# Patient Record
Sex: FEMALE | Race: WHITE | Employment: OTHER | URBAN - METROPOLITAN AREA
[De-identification: names, ages, dates, MRNs, and addresses within clinical notes are randomized per-mention and may not be internally consistent; named-entity substitution may affect disease eponyms.]

---

## 2021-10-24 ENCOUNTER — ANESTHESIA EVENT (OUTPATIENT)
Dept: ENDOSCOPY | Age: 71
End: 2021-10-24
Payer: MEDICARE

## 2021-10-24 RX ORDER — SODIUM CHLORIDE, SODIUM LACTATE, POTASSIUM CHLORIDE, CALCIUM CHLORIDE 600; 310; 30; 20 MG/100ML; MG/100ML; MG/100ML; MG/100ML
100 INJECTION, SOLUTION INTRAVENOUS CONTINUOUS
Status: CANCELLED | OUTPATIENT
Start: 2021-10-24

## 2021-10-24 RX ORDER — SODIUM CHLORIDE 0.9 % (FLUSH) 0.9 %
5-40 SYRINGE (ML) INJECTION EVERY 8 HOURS
Status: CANCELLED | OUTPATIENT
Start: 2021-10-24

## 2021-10-24 RX ORDER — SODIUM CHLORIDE 0.9 % (FLUSH) 0.9 %
5-40 SYRINGE (ML) INJECTION AS NEEDED
Status: CANCELLED | OUTPATIENT
Start: 2021-10-24

## 2021-10-25 ENCOUNTER — ANESTHESIA (OUTPATIENT)
Dept: ENDOSCOPY | Age: 71
End: 2021-10-25
Payer: MEDICARE

## 2021-10-25 ENCOUNTER — HOSPITAL ENCOUNTER (OUTPATIENT)
Age: 71
Setting detail: OUTPATIENT SURGERY
Discharge: HOME | End: 2021-10-25
Attending: INTERNAL MEDICINE | Admitting: INTERNAL MEDICINE
Payer: MEDICARE

## 2021-10-25 VITALS
HEART RATE: 84 BPM | OXYGEN SATURATION: 99 % | DIASTOLIC BLOOD PRESSURE: 59 MMHG | WEIGHT: 164 LBS | HEIGHT: 64 IN | SYSTOLIC BLOOD PRESSURE: 116 MMHG | BODY MASS INDEX: 28 KG/M2 | RESPIRATION RATE: 16 BRPM | TEMPERATURE: 98 F

## 2021-10-25 PROCEDURE — 2709999900 HC NON-CHARGEABLE SUPPLY: Performed by: INTERNAL MEDICINE

## 2021-10-25 PROCEDURE — 76040000025: Performed by: INTERNAL MEDICINE

## 2021-10-25 PROCEDURE — 74011000250 HC RX REV CODE- 250: Performed by: NURSE ANESTHETIST, CERTIFIED REGISTERED

## 2021-10-25 PROCEDURE — 88305 TISSUE EXAM BY PATHOLOGIST: CPT

## 2021-10-25 PROCEDURE — 88312 SPECIAL STAINS GROUP 1: CPT

## 2021-10-25 PROCEDURE — 76060000031 HC ANESTHESIA FIRST 0.5 HR: Performed by: INTERNAL MEDICINE

## 2021-10-25 PROCEDURE — 77030021593 HC FCPS BIOP ENDOSC BSC -A: Performed by: INTERNAL MEDICINE

## 2021-10-25 PROCEDURE — 74011250636 HC RX REV CODE- 250/636: Performed by: NURSE ANESTHETIST, CERTIFIED REGISTERED

## 2021-10-25 RX ORDER — GLYCOPYRROLATE 0.2 MG/ML
INJECTION INTRAMUSCULAR; INTRAVENOUS AS NEEDED
Status: DISCONTINUED | OUTPATIENT
Start: 2021-10-25 | End: 2021-10-25 | Stop reason: HOSPADM

## 2021-10-25 RX ORDER — SODIUM CHLORIDE, SODIUM LACTATE, POTASSIUM CHLORIDE, CALCIUM CHLORIDE 600; 310; 30; 20 MG/100ML; MG/100ML; MG/100ML; MG/100ML
INJECTION, SOLUTION INTRAVENOUS
Status: DISCONTINUED | OUTPATIENT
Start: 2021-10-25 | End: 2021-10-25 | Stop reason: HOSPADM

## 2021-10-25 RX ORDER — LIDOCAINE HYDROCHLORIDE 20 MG/ML
INJECTION, SOLUTION EPIDURAL; INFILTRATION; INTRACAUDAL; PERINEURAL AS NEEDED
Status: DISCONTINUED | OUTPATIENT
Start: 2021-10-25 | End: 2021-10-25 | Stop reason: HOSPADM

## 2021-10-25 RX ORDER — PROPOFOL 10 MG/ML
INJECTION, EMULSION INTRAVENOUS
Status: DISCONTINUED | OUTPATIENT
Start: 2021-10-25 | End: 2021-10-25 | Stop reason: HOSPADM

## 2021-10-25 RX ORDER — PROPOFOL 10 MG/ML
INJECTION, EMULSION INTRAVENOUS AS NEEDED
Status: DISCONTINUED | OUTPATIENT
Start: 2021-10-25 | End: 2021-10-25 | Stop reason: HOSPADM

## 2021-10-25 RX ADMIN — SODIUM CHLORIDE, SODIUM LACTATE, POTASSIUM CHLORIDE, AND CALCIUM CHLORIDE: 600; 310; 30; 20 INJECTION, SOLUTION INTRAVENOUS at 10:48

## 2021-10-25 RX ADMIN — PROPOFOL 50 MG: 10 INJECTION, EMULSION INTRAVENOUS at 10:54

## 2021-10-25 RX ADMIN — LIDOCAINE HYDROCHLORIDE 100 MG: 20 INJECTION, SOLUTION EPIDURAL; INFILTRATION; INTRACAUDAL; PERINEURAL at 10:54

## 2021-10-25 RX ADMIN — PROPOFOL 180 MCG/KG/MIN: 10 INJECTION, EMULSION INTRAVENOUS at 10:54

## 2021-10-25 RX ADMIN — GLYCOPYRROLATE 0.1 MG: 0.2 INJECTION INTRAMUSCULAR; INTRAVENOUS at 10:54

## 2021-10-25 RX ADMIN — PROPOFOL 20 MG: 10 INJECTION, EMULSION INTRAVENOUS at 10:55

## 2021-10-25 NOTE — DISCHARGE INSTRUCTIONS
Gastrointestinal Esophagogastroduodenoscopy (EGD) - Upper Exam Discharge Instructions    1. Call Dr. Milly Call at [phone] for any problems or questions. 2. Contact the doctor's office for follow up appointment as directed. 3. Medication may cause drowsiness for several hours, therefore:  · Do not drive or operate machinery for remainder of the day. · No alcohol today. · Do not make any important or legal decisions for 24 hours. · Do not sign any legal documents for 24 hours. 5. Ordinarily, you may resume regular diet and activity after exam unless otherwise specified by your physician. 6. For mild soreness in your throat you may use Cepacol throat lozenges or warm salt-water gargles as needed.     Any additional instructions:  Dr. India Doherty office will call with pathology results and follow up appointment

## 2021-10-25 NOTE — PROGRESS NOTES
's visit attempted in pre- area. Ms. Karlene Serrano went to her procedure prior to my arrival. I visited with family waiting and conveyed care and concern. Family voiced no needs.      Jacinta Mills 67  Board Certified

## 2021-10-25 NOTE — PROGRESS NOTES
's pre-procedure visit and prayer with patient as requested.     Gregory Falcon MDiv, BS  Board Certified

## 2021-10-25 NOTE — PROCEDURES
Esophagogastroduodenoscopy    DATE of PROCEDURE: 10/25/2021    INDICATION:    POSTPROCEDURE DIAGNOSIS:    MEDICATIONS ADMINISTERED: MAC anesthesia (see anesthesia report)    INSTRUMENT:    PROCEDURE:  After obtaining informed consent, the patient was placed in the left lateral position and sedated. The endoscope was advanced under direct vision without difficulty. The esophagus, stomach (including retroflexed views) and duodenum were evaluated. The patient was taken to the recovery area in stable condition.     FINDINGS:  ESOPHAGUS: no varices  STOMACH: antral gastritis, small hiatal hernia; folds distended normally, no portal HTN gastropathy or varices  DUODENUM: 5 mm polyp in the bulb (cold biopsies taken), nodular appearing bulb    Estimated blood loss: 0-minimal   Specimens obtained during procedure: yes    PLAN:  Check path  OV 3 mo

## 2021-10-25 NOTE — H&P
PreProcedure H&P Update    Today's Date:  10/25/2021    CC:  cirrhosis    Subjective:   HPI: cirrhosis    PMH:  Past Medical History:   Diagnosis Date    Hearing loss     wears hearing aids    History of IBS     Interstitial lung disease ([address]     wears 2L NC continuous    Jaundice     patient reported elevated liver enzymes    Nausea & vomiting     no nausea with recent lung biopsy 2021    Osteoporosis     Primary biliary cholangitis ([address]     patient reported    Pruritus     Subjective tremor     Thyroid disease     medications    Vitamin D deficiency        Medications:   No current facility-administered medications for this encounter. Objective:   Vitals:  Visit Vitals  /80   Pulse 76   Temp 97.7 °F (36.5 °C)   Resp 18   Ht 5' 4\" (1.626 m)   Wt 74.4 kg (164 lb)   SpO2 100%   BMI 28.15 kg/m²     Exam:  General appearance: alert, cooperative, no distress  Lungs: clear to auscultation bilaterally anteriorly  Heart: regular rate and rhythm  Abdomen: soft, non-tender. Bowel sounds normal. No masses, no organomegaly      Data Review (Labs):    No results for input(s): WBC, HGB, HCT, PLT, MCV, NA, K, CL, CO2, BUN, CREA, CA, GLU, AP, TBIL, CBIL, ALB, TP, AML, LPSE, PTP, INR, APTT, HGBEXT, HCTEXT, PLTEXT, INREXT in the last 72 hours. No lab exists for component: SGOT, GPT, DBIL    Plan:     Cirrhosis.   EGD to screen for esophageal varices

## 2021-10-25 NOTE — ANESTHESIA PREPROCEDURE EVALUATION
Relevant Problems   No relevant active problems       Anesthetic History     PONV          Review of Systems / Medical History  Patient summary reviewed and pertinent labs reviewed    Pulmonary          Shortness of breath      Comments: ILD - on O2 continuously   Neuro/Psych   Within defined limits           Cardiovascular                  Exercise tolerance: <4 METS     GI/Hepatic/Renal           Liver disease (Primary biliary cirrhosis)     Endo/Other      Hypothyroidism: well controlled       Other Findings              Physical Exam    Airway  Mallampati: II  TM Distance: 4 - 6 cm  Neck ROM: normal range of motion   Mouth opening: Normal     Cardiovascular    Rhythm: regular  Rate: normal         Dental    Dentition: Upper partial plate     Pulmonary      Decreased breath sounds: bilateral           Abdominal         Other Findings            Anesthetic Plan    ASA: 4  Anesthesia type: total IV anesthesia          Induction: Intravenous  Anesthetic plan and risks discussed with: Patient

## 2021-10-27 NOTE — ANESTHESIA POSTPROCEDURE EVALUATION
Procedure(s):  ESOPHAGOGASTRODUODENOSCOPY (EGD)/BMI 30  ESOPHAGOGASTRODUODENAL (EGD) BIOPSY.     total IV anesthesia    Anesthesia Post Evaluation      Multimodal analgesia: multimodal analgesia used between 6 hours prior to anesthesia start to PACU discharge  Patient location during evaluation: PACU  Patient participation: complete - patient participated  Level of consciousness: awake and alert  Pain management: adequate  Airway patency: patent  Anesthetic complications: no  Cardiovascular status: acceptable and hemodynamically stable  Respiratory status: acceptable  Hydration status: acceptable  Post anesthesia nausea and vomiting:  none  Final Post Anesthesia Temperature Assessment:  Normothermia (36.0-37.5 degrees C)      INITIAL Post-op Vital signs:   Vitals Value Taken Time   /59 10/25/21 1117   Temp 36.7 °C (98 °F) 10/25/21 1106   Pulse 84 10/25/21 1117   Resp 16 10/25/21 1117   SpO2 99 % 10/25/21 1117

## 2022-09-09 ENCOUNTER — PREP FOR PROCEDURE (OUTPATIENT)
Dept: ADMINISTRATIVE | Age: 72
End: 2022-09-09

## 2022-09-09 RX ORDER — SODIUM CHLORIDE 0.9 % (FLUSH) 0.9 %
5-40 SYRINGE (ML) INJECTION EVERY 12 HOURS SCHEDULED
OUTPATIENT
Start: 2022-09-09

## 2022-09-09 RX ORDER — SODIUM CHLORIDE 9 MG/ML
INJECTION, SOLUTION INTRAVENOUS PRN
OUTPATIENT
Start: 2022-09-09

## 2022-09-09 RX ORDER — SODIUM CHLORIDE 0.9 % (FLUSH) 0.9 %
5-40 SYRINGE (ML) INJECTION PRN
OUTPATIENT
Start: 2022-09-09

## 2022-10-11 RX ORDER — PRAVASTATIN SODIUM 10 MG
10 TABLET ORAL NIGHTLY
COMMUNITY

## 2022-10-11 RX ORDER — TRAZODONE HYDROCHLORIDE 50 MG/1
50 TABLET ORAL AS NEEDED
COMMUNITY

## 2022-10-11 RX ORDER — ONDANSETRON 4 MG/1
4 TABLET, FILM COATED ORAL EVERY 8 HOURS PRN
COMMUNITY

## 2022-10-11 NOTE — PERIOP NOTE
Patient verified name, , and procedure. Type: 1a; abbreviated assessment per anesthesia guidelines  Labs per surgeon: none  Labs per anesthesia: none      Instructed pt that they will be notified by the Gi Lab for time of arrival. If any questions please call the GI lab at [phone]. Follow diet and prep instructions per office. May have clear liquids until 4 hours prior to time of arrival.    [place]oxo or shower the night before and the am of surgery with antibacterial soap. No lotions, oils, powders, cologne on skin. No make up, eye make up or jewelry. Wear loose fitting comfortable, clean clothing. Must have adult present in building the entire time . Medications for the day of procedure: Synthroid. Pt instructed to hold: Biotin,Vitamin D, and all other vitamins, supplements, and NSAIDs. The following discharge instructions reviewed with patient: medication given during procedure may cause drowsiness for several hours, therefore, do not drive or operate machinery for remainder of the day, no alcohol on the day of your procedure, resume regular diet and activity unless otherwise directed, for mild sore throat you may use Cepacol throat lozenges or warm salt water gargles as needed, call your physician for any problems or questions. Patient verbalizes understanding.

## 2022-10-13 NOTE — PROGRESS NOTES
Spoke with pt regarding tomorrow's procedure. PT verbalized understanding of early arrival time as well as  policy.

## 2022-10-14 ENCOUNTER — ANESTHESIA EVENT (OUTPATIENT)
Dept: ENDOSCOPY | Age: 72
End: 2022-10-14
Payer: MEDICARE

## 2022-10-14 ENCOUNTER — ANESTHESIA (OUTPATIENT)
Dept: ENDOSCOPY | Age: 72
End: 2022-10-14
Payer: MEDICARE

## 2022-10-14 ENCOUNTER — HOSPITAL ENCOUNTER (OUTPATIENT)
Age: 72
Setting detail: OUTPATIENT SURGERY
Discharge: HOME | End: 2022-10-14
Attending: INTERNAL MEDICINE | Admitting: INTERNAL MEDICINE
Payer: MEDICARE

## 2022-10-14 VITALS
OXYGEN SATURATION: 100 % | WEIGHT: 132 LBS | HEART RATE: 69 BPM | DIASTOLIC BLOOD PRESSURE: 54 MMHG | BODY MASS INDEX: 22.53 KG/M2 | SYSTOLIC BLOOD PRESSURE: 104 MMHG | HEIGHT: 64 IN | TEMPERATURE: 98.5 F | RESPIRATION RATE: 17 BRPM

## 2022-10-14 PROCEDURE — 6360000002 HC RX W HCPCS: Performed by: NURSE ANESTHETIST, CERTIFIED REGISTERED

## 2022-10-14 PROCEDURE — 3609012400 HC EGD TRANSORAL BIOPSY SINGLE/MULTIPLE: Performed by: INTERNAL MEDICINE

## 2022-10-14 PROCEDURE — 7100000011 HC PHASE II RECOVERY - ADDTL 15 MIN: Performed by: INTERNAL MEDICINE

## 2022-10-14 PROCEDURE — 3700000000 HC ANESTHESIA ATTENDED CARE: Performed by: INTERNAL MEDICINE

## 2022-10-14 PROCEDURE — 2580000003 HC RX 258: Performed by: ANESTHESIOLOGY

## 2022-10-14 PROCEDURE — 88305 TISSUE EXAM BY PATHOLOGIST: CPT

## 2022-10-14 PROCEDURE — 7100000010 HC PHASE II RECOVERY - FIRST 15 MIN: Performed by: INTERNAL MEDICINE

## 2022-10-14 PROCEDURE — 2709999900 HC NON-CHARGEABLE SUPPLY: Performed by: INTERNAL MEDICINE

## 2022-10-14 RX ORDER — PROPOFOL 10 MG/ML
INJECTION, EMULSION INTRAVENOUS PRN
Status: DISCONTINUED | OUTPATIENT
Start: 2022-10-14 | End: 2022-10-14 | Stop reason: SDUPTHER

## 2022-10-14 RX ORDER — URSODIOL 300 MG/1
300 CAPSULE ORAL 2 TIMES DAILY
COMMUNITY

## 2022-10-14 RX ORDER — SODIUM CHLORIDE, SODIUM LACTATE, POTASSIUM CHLORIDE, CALCIUM CHLORIDE 600; 310; 30; 20 MG/100ML; MG/100ML; MG/100ML; MG/100ML
INJECTION, SOLUTION INTRAVENOUS CONTINUOUS
Status: DISCONTINUED | OUTPATIENT
Start: 2022-10-14 | End: 2022-10-14 | Stop reason: HOSPADM

## 2022-10-14 RX ADMIN — PROPOFOL 50 MG: 10 INJECTION, EMULSION INTRAVENOUS at 09:49

## 2022-10-14 RX ADMIN — SODIUM CHLORIDE, POTASSIUM CHLORIDE, SODIUM LACTATE AND CALCIUM CHLORIDE: 600; 310; 30; 20 INJECTION, SOLUTION INTRAVENOUS at 09:38

## 2022-10-14 RX ADMIN — PROPOFOL 50 MG: 10 INJECTION, EMULSION INTRAVENOUS at 09:51

## 2022-10-14 RX ADMIN — PROPOFOL 50 MG: 10 INJECTION, EMULSION INTRAVENOUS at 09:46

## 2022-10-14 ASSESSMENT — PAIN - FUNCTIONAL ASSESSMENT
PAIN_FUNCTIONAL_ASSESSMENT: NONE - DENIES PAIN
PAIN_FUNCTIONAL_ASSESSMENT: FACE, LEGS, ACTIVITY, CRY, AND CONSOLABILITY (FLACC)
PAIN_FUNCTIONAL_ASSESSMENT: NONE - DENIES PAIN

## 2022-10-14 ASSESSMENT — ENCOUNTER SYMPTOMS: SHORTNESS OF BREATH: 1

## 2022-10-14 NOTE — DISCHARGE INSTRUCTIONS
Gastrointestinal Esophagogastroduodenoscopy (EGD) - Upper Exam Discharge Instructions    1. Call Dr. Glenny Sánchez at [phone] for any problems or questions. 2. Contact the doctor's office for follow up appointment as directed. 3. Medication may cause drowsiness for several hours, therefore:  Do not drive or operate machinery for remainder of the day. No alcohol today. Do not make any important or legal decisions for 24 hours. Do not sign any legal documents for 24 hours. 5. Ordinarily, you may resume regular diet and activity after exam unless otherwise specified by your physician. 6. For mild soreness in your throat you may use Cepacol throat lozenges or warm salt-water gargles as needed. Any additional instructions:  Await pathology results. Repeat EGD in 3 years.

## 2022-10-14 NOTE — OP NOTE
PROCEDURE: Esophagogastroduodenoscopy with biopsy    ENDOSCOPIST: Matheus Duque M.D. PREOPERATIVE DIAGNOSIS: Duodenal polyp    POSTOPERATIVE DIAGNOSIS: Duodenitis, hiatal hernia    INSTRUMENTS: [id number]    SEDATION: MAC    DESCRIPTION: After informed consent was obtained, the patient was taken to the endoscopy suite and placed in the left lateral decubitus position. Intravenous sedation was administered by the Anesthesia provider. After adequate sedation had been achieved the endoscope was inserted over the tongue and through the posterior pharynx under direct visualization down the esophagus to the stomach and into the second portion of the duodenum. The endoscopic was withdrawn from that point, performing a careful survey of the mucosa. Retroflexion was performed in the gastric fundus. FINDINGS:  Esophagus: Normal. No varices    Stomach: Small hiatal hernia. No stigmata of portal HTN    Duodenum: The mucosa of the bulb is diffusely nodular. Biopsies obtained. D2 is normal.    Estimated blood loss:  0 cc-minimal    IMPRESSION: Nonspecific duodenopathy. No recurrent polyp    PLAN: Review pathology. Tentatively EGD 3 years for variceal screening.       Elise Moreno MD

## 2022-10-14 NOTE — ANESTHESIA PRE PROCEDURE
Department of Anesthesiology  Preprocedure Note       Name:  Lorna Mckeon   Age:  67 y.o.  :  1950                                          MRN:  [id number]         Date:  10/14/2022      Surgeon: Amira Rogers):  Joann Castellanos MD    Procedure: Procedure(s):  EGD ESOPHAGOGASTRODUODENOSCOPY/26    Medications prior to admission:   Prior to Admission medications    Medication Sig Start Date End Date Taking? Authorizing Provider   ursodiol (ACTIGALL) 300 MG capsule Take 300 mg by mouth 2 times daily   Yes Historical Provider, MD   rifAXIMin (XIFAXAN) 550 MG tablet Take 550 mg by mouth 2 times daily  Patient not taking: Reported on 10/14/2022   Yes Historical Provider, MD   traZODone (DESYREL) 50 MG tablet Take 50 mg by mouth as needed for Sleep   Yes Historical Provider, MD   pravastatin (PRAVACHOL) 10 MG tablet Take 10 mg by mouth at bedtime   Yes Historical Provider, MD   ondansetron (ZOFRAN) 4 MG tablet Take 4 mg by mouth every 8 hours as needed for Nausea or Vomiting   Yes Historical Provider, MD   BIOTIN PO Take by mouth at bedtime    Ar Automatic Reconciliation   Cholecalciferol 50 MCG (2000 UT) CAPS Take 5,000 Units by mouth daily    Ar Automatic Reconciliation   cyanocobalamin 1000 MCG/ML injection Inject 1,000 mcg into the muscle every 30 days 21   Ar Automatic Reconciliation   levothyroxine (SYNTHROID) 88 MCG tablet Take 88 mcg by mouth daily 21   Ar Automatic Reconciliation       Current medications:    No current facility-administered medications for this encounter. Allergies: Allergies   Allergen Reactions    Trimethoprim Nausea Only    Alendronate Other (See Comments)     Hair loss    Sulfamethoxazole-Trimethoprim Nausea Only    Nitrofurantoin Nausea Only       Problem List:  There is no problem list on file for this patient.       Past Medical History:        Diagnosis Date    Collar bone pain     broken collar bone on the left    Former cigarette smoker     Hearing loss     wearsbil  hearing aids    History of IBS     Hyperlipidemia     Interstitial lung disease (HCC)     wears 2L NC continuous    Jaundice     patient reported elevated liver enzymes    Nausea & vomiting     no nausea with recent lung biopsy     Osteoporosis     Oxygen dependent     2 LNC continuously    PONV (postoperative nausea and vomiting)     Pt has N/V with most procedures, pt did well with last 2 procedures    Primary biliary cholangitis ([address]     patient reported    Pruritus     Subjective tremor     Thyroid disease     medications    Vitamin D deficiency        Past Surgical History:        Procedure Laterality Date    BLADDER SUSPENSION      CHOLECYSTECTOMY, LAPAROSCOPIC      COLONOSCOPY      HYSTERECTOMY (CERVIX STATUS UNKNOWN)      LUNG REMOVAL, PARTIAL Right     right top lobectomy    ORTHOPEDIC SURGERY      humerus head    OTHER SURGICAL HISTORY Left     lung biopsy    ROTATOR CUFF REPAIR Right        Social History:    Social History     Tobacco Use    Smoking status: Former     Packs/day: 1.50     Years: 15.00     Pack years: 22.50     Types: Cigarettes     Quit date:      Years since quittin.    Smokeless tobacco: Never    Tobacco comments:     Quit smoking: quit 20 years ago   Substance Use Topics    Alcohol use: Not Currently                                Counseling given: Not Answered  Tobacco comments: Quit smoking: quit 20 years ago      Vital Signs (Current):   Vitals:    10/11/22 1504   Weight: 132 lb (59.9 kg)   Height: 5' 4\" (1.626 m)                                              BP Readings from Last 3 Encounters:   No data found for BP       NPO Status:                                                                                 BMI:   Wt Readings from Last 3 Encounters:   10/11/22 132 lb (59.9 kg)   10/21/21 165 lb (74.8 kg)     Body mass index is 22.66 kg/m².     CBC: No results found for: WBC, RBC, HGB, HCT, MCV, RDW, PLT    CMP: No results found for: NA, K, CL, CO2, BUN, CREATININE, GFRAA, AGRATIO, LABGLOM, GLUCOSE, GLU, PROT, CALCIUM, BILITOT, ALKPHOS, AST, ALT    POC Tests: No results for input(s): POCGLU, POCNA, POCK, POCCL, POCBUN, POCHEMO, POCHCT in the last 72 hours. Coags: No results found for: PROTIME, INR, APTT    HCG (If Applicable): No results found for: PREGTESTUR, PREGSERUM, HCG, HCGQUANT     ABGs: No results found for: PHART, PO2ART, OGR9VCC, OET5LEO, BEART, G0UJEQRK     Type & Screen (If Applicable):  No results found for: LABABO, LABRH    Drug/Infectious Status (If Applicable):  No results found for: HIV, HEPCAB    COVID-19 Screening (If Applicable): No results found for: COVID19        Anesthesia Evaluation  Patient summary reviewed and Nursing notes reviewed  Airway: Mallampati: II  TM distance: >3 FB   Neck ROM: full  Mouth opening: > = 3 FB   Dental: normal exam         Pulmonary:Negative Pulmonary ROS and normal exam  breath sounds clear to auscultation  (+) shortness of breath (ILD 2 L oxygen - Stable):                             Cardiovascular:Negative CV ROS  Exercise tolerance: good (>4 METS),           Rhythm: regular  Rate: normal                    Neuro/Psych:   Negative Neuro/Psych ROS              GI/Hepatic/Renal:   (+) liver disease:,           Endo/Other: Negative Endo/Other ROS                    Abdominal:             Vascular: negative vascular ROS. Other Findings:           Anesthesia Plan      TIVA     ASA 2       Induction: intravenous. Anesthetic plan and risks discussed with patient.                         Serene Van MD   10/14/2022

## 2022-10-14 NOTE — H&P
[facility] H&P (Admission)        Date:  10/14/2022    Primary GI Physician:  Sharon Hartmann    Chief Complaint:  duodenal polyp    Subjective:     History of Present Illness:  Patient is a 67 y.o. female with PMH ncluding but not limited to , who is being admitted for EGD. PMH:  Past Medical History:   Diagnosis Date    Collar bone pain     broken collar bone on the left    Former cigarette smoker     Hearing loss     wearsbil  hearing aids    History of IBS     Hyperlipidemia     Interstitial lung disease (HCC)     wears 2L NC continuous    Jaundice     patient reported elevated liver enzymes    Nausea & vomiting     no nausea with recent lung biopsy 2021    Osteoporosis     Oxygen dependent     2 LNC continuously    PONV (postoperative nausea and vomiting)     Pt has N/V with most procedures, pt did well with last 2 procedures    Primary biliary cholangitis ([address]     patient reported    Pruritus     Subjective tremor     Thyroid disease     medications    Vitamin D deficiency        PSH:  Past Surgical History:   Procedure Laterality Date    BLADDER SUSPENSION      CHOLECYSTECTOMY, LAPAROSCOPIC      COLONOSCOPY      HYSTERECTOMY (CERVIX STATUS UNKNOWN)      LUNG REMOVAL, PARTIAL Right     right top lobectomy    ORTHOPEDIC SURGERY      humerus head    OTHER SURGICAL HISTORY Left 2021    lung biopsy    ROTATOR CUFF REPAIR Right        Allergies: Allergies   Allergen Reactions    Trimethoprim Nausea Only    Alendronate Other (See Comments)     Hair loss    Sulfamethoxazole-Trimethoprim Nausea Only    Nitrofurantoin Nausea Only       Home Medications:  Prior to Admission medications    Medication Sig Start Date End Date Taking?  Authorizing Provider   ursodiol (ACTIGALL) 300 MG capsule Take 300 mg by mouth 2 times daily   Yes Historical Provider, MD   rifAXIMin (XIFAXAN) 550 MG tablet Take 550 mg by mouth 2 times daily  Patient not taking: Reported on 10/14/2022   Yes Historical Provider, MD   traZODone (DESYREL) 50 MG tablet Take 50 mg by mouth as needed for Sleep   Yes Historical Provider, MD   pravastatin (PRAVACHOL) 10 MG tablet Take 10 mg by mouth at bedtime   Yes Historical Provider, MD   ondansetron (ZOFRAN) 4 MG tablet Take 4 mg by mouth every 8 hours as needed for Nausea or Vomiting   Yes Historical Provider, MD   BIOTIN PO Take by mouth at bedtime    Ar Automatic Reconciliation   Cholecalciferol 50 MCG (2000 UT) CAPS Take 5,000 Units by mouth daily    Ar Automatic Reconciliation   cyanocobalamin 1000 MCG/ML injection Inject 1,000 mcg into the muscle every 30 days 21   Ar Automatic Reconciliation   levothyroxine (SYNTHROID) 88 MCG tablet Take 88 mcg by mouth daily 21   Ar [address] Medications:  Current Facility-Administered Medications   Medication Dose Route Frequency    lactated ringers infusion   IntraVENous Continuous       Social History:  Social History     Tobacco Use    Smoking status: Former     Packs/day: 1.50     Years: 15.00     Pack years: 22.50     Types: Cigarettes     Quit date:      Years since quittin.8    Smokeless tobacco: Never    Tobacco comments:     Quit smoking: quit 20 years ago   Substance Use Topics    Alcohol use: Not Currently       Pt denies any history of drug use, tattoos, or blood transfusions. Family History:  History reviewed. No pertinent family history. Review of Systems:  A detailed 10 system ROS is obtained, with pertinent positives as listed above. All others are negative. Objective:     Physical Exam:  Vitals:  /72   Pulse 82   Temp 98.2 °F (36.8 °C) (Oral)   Resp 18   Ht 5' 4\" (1.626 m)   Wt 132 lb (59.9 kg)   SpO2 100%   BMI 22.66 kg/m²   Gen:  Pt is alert, cooperative, no acute distress  Skin:  Extremities and face reveal no rashes. HEENT: Sclerae anicteric. Extra-occular muscles are intact. No oral ulcers. No abnormal pigmentation of the lips. The neck is supple.   Cardiovascular: Regular rate and rhythm. No murmurs, gallops, or rubs. Respiratory:  Comfortable breathing with no accessory muscle use. Clear breath sounds with no wheezes, rales, or rhonchi. GI:  Abdomen nondistended, soft, and nontender. Normal active bowel sounds. No enlargement of the liver or spleen. No masses palpable. Rectal:  Deferred  Musculoskeletal:  No pitting edema of the lower legs. Neurological:  Gross memory appears intact. Patient is alert and oriented. Psychiatric:  Mood appears appropriate with judgement intact. Lymphatic:  No cervical or supraclavicular adenopathy. Laboratory:    No results for input(s): WBC, HGB, HCT, PLT, MCV, NA, K, CL, CO2, BUN, CREATININE, CALCIUM, MG, PHOS, GLUCOSE, ALKPHOS, AST, ALT, BILITOT, BILIDIR, LABALBU, PROT, AMYLASE, LIPASE, INR, APTT in the last 72 hours. Invalid input(s): GPT, LACTICACID, PT    Assessment:       Active Problems:    * No active hospital problems. *  Resolved Problems:    * No resolved hospital problems.  *    Duodenal polyp  Plan:       EGD

## 2022-10-14 NOTE — PROGRESS NOTES
Pt discharged home with her friend, Michael Jeronimo. VSS. Discharge instructions reviewed with patient and friend, understanding verbalized, and copy of instructions sent home with patient. All belongings sent home with patient. Patient transported out via wheelchair by Rene Longo.

## 2022-10-14 NOTE — ANESTHESIA POSTPROCEDURE EVALUATION
Department of Anesthesiology  Postprocedure Note    Patient: Jyotsna España  MRN: [id number]  YOB: 1950  Date of evaluation: 10/14/2022      Procedure Summary     Date: 10/14/22 Room / Location: [facility] ENDO 04 / [facility] ENDOSCOPY    Anesthesia Start: 0942 Anesthesia Stop: 1001    Procedure: EGD BIOPSY (Upper GI Region) Diagnosis:       Nausea      (Nausea [R11.0])    Surgeons: Edenilson Toney MD Responsible Provider: Stas Parisi MD    Anesthesia Type: TIVA ASA Status: 2          Anesthesia Type: TIVA    Yasmeen Phase I: Yasmeen Score: 10    Yasmeen Phase II: Yasmeen Score: 8      Anesthesia Post Evaluation    Patient location during evaluation: PACU  Patient participation: complete - patient participated  Level of consciousness: awake and alert  Airway patency: patent  Nausea & Vomiting: no nausea and no vomiting  Complications: no  Cardiovascular status: hemodynamically stable  Respiratory status: acceptable, nonlabored ventilation and spontaneous ventilation  Hydration status: euvolemic  Comments: BP (!) 145/59   Pulse 74   Temp 98.5 °F (36.9 °C) (Skin)   Resp 14   Ht 5' 4\" (1.626 m)   Wt 132 lb (59.9 kg)   SpO2 99%   BMI 22.66 kg/m²     Multimodal analgesia pain management approach

## 2023-03-01 ENCOUNTER — HOSPITAL ENCOUNTER (OUTPATIENT)
Dept: CT IMAGING | Age: 73
Discharge: HOME | End: 2023-03-04
Payer: MEDICARE

## 2023-03-01 DIAGNOSIS — K74.4 SECONDARY BILIARY CIRRHOSIS (HCC): ICD-10-CM

## 2023-03-01 DIAGNOSIS — K74.3 PRIMARY BILIARY CIRRHOSIS (HCC): ICD-10-CM

## 2023-03-01 DIAGNOSIS — R74.8 ABNORMAL LEVELS OF OTHER SERUM ENZYMES: ICD-10-CM

## 2023-03-01 LAB — CREAT BLD-MCNC: 0.76 MG/DL (ref 0.8–1.5)

## 2023-03-01 PROCEDURE — 6360000004 HC RX CONTRAST MEDICATION: Performed by: INTERNAL MEDICINE

## 2023-03-01 PROCEDURE — 74160 CT ABDOMEN W/CONTRAST: CPT

## 2023-03-01 PROCEDURE — 82565 ASSAY OF CREATININE: CPT

## 2023-03-01 RX ORDER — SODIUM CHLORIDE 0.9 % (FLUSH) 0.9 %
10 SYRINGE (ML) INJECTION
Status: DISCONTINUED | OUTPATIENT
Start: 2023-03-01 | End: 2023-03-05 | Stop reason: HOSPADM

## 2023-03-01 RX ORDER — 0.9 % SODIUM CHLORIDE 0.9 %
100 INTRAVENOUS SOLUTION INTRAVENOUS ONCE
Status: DISCONTINUED | OUTPATIENT
Start: 2023-03-01 | End: 2023-03-05 | Stop reason: HOSPADM

## 2023-03-01 RX ADMIN — IOPAMIDOL 100 ML: 755 INJECTION, SOLUTION INTRAVENOUS at 13:06

## 2023-03-01 RX ADMIN — DIATRIZOATE MEGLUMINE AND DIATRIZOATE SODIUM 15 ML: 660; 100 LIQUID ORAL; RECTAL at 13:07

## (undated) DEVICE — FORCEPS BX L240CM JAW DIA2.8MM L CAP W/ NDL MIC MESH TOOTH

## (undated) DEVICE — AIRLIFE™ OXYGEN TUBING 7 FEET (2.1 M) CRUSH RESISTANT OXYGEN TUBING, VINYL TIPPED: Brand: AIRLIFE™

## (undated) DEVICE — NEEDLE SYR 18GA L1.5IN RED PLAS HUB S STL BLNT FILL W/O

## (undated) DEVICE — KENDALL RADIOLUCENT FOAM MONITORING ELECTRODE RECTANGULAR SHAPE: Brand: KENDALL

## (undated) DEVICE — LUBE JELLY FOIL PACK 1.4 OZ: Brand: MEDLINE INDUSTRIES, INC.

## (undated) DEVICE — SYRINGE MED 10ML LUERLOCK TIP W/O SFTY DISP

## (undated) DEVICE — BLOCK BITE AD 60FR W/ VELC STRP ADDRESSES MOST PT AND

## (undated) DEVICE — SYRINGE MED 3ML CLR PLAS STD N CTRL LUERLOCK TIP DISP

## (undated) DEVICE — GAUZE,SPONGE,4"X4",12PLY,WOVEN,NS,LF: Brand: MEDLINE

## (undated) DEVICE — SYRINGE, LUER SLIP, STERILE, 60ML: Brand: MEDLINE

## (undated) DEVICE — CONNECTOR TBNG OD5-7MM O2 END DISP

## (undated) DEVICE — CANNULA NSL ORAL AD FOR CAPNOFLEX CO2 O2 AIRLFE

## (undated) DEVICE — CONTAINER PREFIL FRMLN 40ML --

## (undated) DEVICE — YANKAUER,BULB TIP,W/O VENT,RIGID,STERILE: Brand: MEDLINE

## (undated) DEVICE — CONTAINER FORMALIN PREFILLED 10% NBF 60ML

## (undated) DEVICE — SINGLE PORT MANIFOLD: Brand: NEPTUNE 2